# Patient Record
Sex: MALE | Race: WHITE | Employment: FULL TIME | URBAN - METROPOLITAN AREA
[De-identification: names, ages, dates, MRNs, and addresses within clinical notes are randomized per-mention and may not be internally consistent; named-entity substitution may affect disease eponyms.]

---

## 2021-08-27 RX ORDER — ASPIRIN 81 MG/1
81 TABLET ORAL DAILY
COMMUNITY

## 2021-08-27 NOTE — PROGRESS NOTES
[facility] PRE-ADMISSION TESTING INSTRUCTIONS    The Preadmission Testing patient is instructed accordingly using the following criteria (check applicable):    ARRIVAL INSTRUCTIONS:  [x] Parking the day of Surgery is located in the Main Entrance lot. Upon entering the door, make an immediate right to the surgery reception desk    [x] Bring photo ID and insurance card    [x] Bring in a copy of Living will or Durable Power of  papers. [x] Please be sure to arrange for responsible adult to provide transportation to and from the hospital    [x] Please arrange for responsible adult to be with you for the 24 hour period post procedure due to having anesthesia      GENERAL INSTRUCTIONS:    [x] Nothing by mouth after midnight, including gum, candy, mints or water    [x] You may brush your teeth, but do not swallow any water    [] Take medications as instructed with 1-2 oz of water    [] Stop herbal supplements and vitamins 5 days prior to procedure    [x] Follow preop dosing of blood thinners per physician instructions    [] Take 1/2 dose of evening insulin, but no insulin after midnight    [] No oral diabetic medications after midnight    [] If diabetic and have low blood sugar or feel symptomatic, take 1-2oz apple juice only    [] Bring inhalers day of surgery    [] Bring C-PAP/ Bi-Pap day of surgery    [] Bring urine specimen day of surgery    [x] Shower or bath with soap, lather and rinse well, AM of Surgery, no lotion, powders or creams     [] Follow bowel prep as instructed per surgeon    [x] No tobacco products within 24 hours of surgery     [x] No alcohol or illegal drug use within 24 hours of surgery.     [x] Jewelry, body piercing's, eyeglasses, contact lenses and dentures are not permitted into surgery (bring cases)      [] Please do not wear any nail polish, make up or hair products on the day of surgery    [] You can expect a call the business day prior to procedure to notify you if your arrival time changes    [x] If you receive a survey after surgery we would greatly appreciate your comments    [] Parent/guardian of a minor must accompany their child and remain on the premises  the entire time they are under our care     [] Pediatric patients may bring favorite toy, blanket or comfort item with them    [] A caregiver or family member must remain with the patient during their stay if they are mentally handicapped, have dementia, disoriented or unable to use a call light or would be a safety concern if left unattended    [x] Please notify surgeon if you develop any illness between now and time of surgery (cold, cough, sore throat, fever, nausea, vomiting) or any signs of infections  including skin, wounds, and dental.    [x]  The Outpatient Pharmacy is available to fill your prescription here on your day of surgery, ask your preop nurse for details    [x] Other instructions: wear loose, comfortable clothing    EDUCATIONAL MATERIALS PROVIDED:    [] PAT Preoperative Education Packet/Booklet     [] Medication List    [] Transfusion bracelet applied with instructions    [] Shower with soap, lather and rinse well, and use CHG wipes provided the evening before surgery as instructed    [] Incentive spirometer with instructions

## 2021-08-27 NOTE — PROGRESS NOTES
Have you been tested for COVID  No  vaccinated         Have you been told you were positive for COVID No  Have you had any known exposure to someone that is positive for COVID No  Do you have a cough                   No              Do you have shortness of breath No                 Do you have a sore throat            No                Are you having chills                    No                Are you having muscle aches. No                    Please come to the hospital wearing a mask and have your significant other wear a mask as well. Both of you should check your temperature before leaving to come here,  if it is 100 or higher please call [phone] for instruction.

## 2021-08-28 ENCOUNTER — ANESTHESIA EVENT (OUTPATIENT)
Dept: OPERATING ROOM | Age: 71
End: 2021-08-28
Payer: MEDICARE

## 2021-08-30 ENCOUNTER — HOSPITAL ENCOUNTER (OUTPATIENT)
Age: 71
Setting detail: OUTPATIENT SURGERY
Discharge: HOME | End: 2021-08-30
Attending: OPHTHALMOLOGY | Admitting: OPHTHALMOLOGY
Payer: MEDICARE

## 2021-08-30 ENCOUNTER — ANESTHESIA (OUTPATIENT)
Dept: OPERATING ROOM | Age: 71
End: 2021-08-30
Payer: MEDICARE

## 2021-08-30 VITALS
BODY MASS INDEX: 20.45 KG/M2 | TEMPERATURE: 97 F | DIASTOLIC BLOOD PRESSURE: 62 MMHG | OXYGEN SATURATION: 98 % | WEIGHT: 151 LBS | HEART RATE: 64 BPM | SYSTOLIC BLOOD PRESSURE: 138 MMHG | HEIGHT: 72 IN | RESPIRATION RATE: 18 BRPM

## 2021-08-30 VITALS
RESPIRATION RATE: 16 BRPM | OXYGEN SATURATION: 100 % | SYSTOLIC BLOOD PRESSURE: 129 MMHG | DIASTOLIC BLOOD PRESSURE: 64 MMHG

## 2021-08-30 PROBLEM — H33.22 RD (RETINAL DETACHMENT), LEFT: Chronic | Status: ACTIVE | Noted: 2021-08-30

## 2021-08-30 PROCEDURE — 6360000002 HC RX W HCPCS

## 2021-08-30 PROCEDURE — 2720000010 HC SURG SUPPLY STERILE: Performed by: OPHTHALMOLOGY

## 2021-08-30 PROCEDURE — 7100000010 HC PHASE II RECOVERY - FIRST 15 MIN: Performed by: OPHTHALMOLOGY

## 2021-08-30 PROCEDURE — 7100000011 HC PHASE II RECOVERY - ADDTL 15 MIN: Performed by: OPHTHALMOLOGY

## 2021-08-30 PROCEDURE — 6370000000 HC RX 637 (ALT 250 FOR IP): Performed by: OPHTHALMOLOGY

## 2021-08-30 PROCEDURE — 3700000000 HC ANESTHESIA ATTENDED CARE: Performed by: OPHTHALMOLOGY

## 2021-08-30 PROCEDURE — 6360000002 HC RX W HCPCS: Performed by: OPHTHALMOLOGY

## 2021-08-30 PROCEDURE — 2580000003 HC RX 258

## 2021-08-30 PROCEDURE — 2709999900 HC NON-CHARGEABLE SUPPLY: Performed by: OPHTHALMOLOGY

## 2021-08-30 PROCEDURE — 3600000003 HC SURGERY LEVEL 3 BASE: Performed by: OPHTHALMOLOGY

## 2021-08-30 PROCEDURE — 2500000003 HC RX 250 WO HCPCS: Performed by: OPHTHALMOLOGY

## 2021-08-30 PROCEDURE — 3600000013 HC SURGERY LEVEL 3 ADDTL 15MIN: Performed by: OPHTHALMOLOGY

## 2021-08-30 PROCEDURE — 2500000003 HC RX 250 WO HCPCS

## 2021-08-30 PROCEDURE — 3700000001 HC ADD 15 MINUTES (ANESTHESIA): Performed by: OPHTHALMOLOGY

## 2021-08-30 RX ORDER — FLURBIPROFEN SODIUM 0.3 MG/ML
1 SOLUTION/ DROPS OPHTHALMIC PRN
Status: COMPLETED | OUTPATIENT
Start: 2021-08-30 | End: 2021-08-30

## 2021-08-30 RX ORDER — MIDAZOLAM HYDROCHLORIDE 1 MG/ML
INJECTION INTRAMUSCULAR; INTRAVENOUS PRN
Status: DISCONTINUED | OUTPATIENT
Start: 2021-08-30 | End: 2021-08-30 | Stop reason: SDUPTHER

## 2021-08-30 RX ORDER — LIDOCAINE HYDROCHLORIDE 10 MG/ML
INJECTION, SOLUTION EPIDURAL; INFILTRATION; INTRACAUDAL; PERINEURAL PRN
Status: DISCONTINUED | OUTPATIENT
Start: 2021-08-30 | End: 2021-08-30 | Stop reason: SDUPTHER

## 2021-08-30 RX ORDER — ATROPINE SULFATE 10 MG/ML
SOLUTION/ DROPS OPHTHALMIC PRN
Status: DISCONTINUED | OUTPATIENT
Start: 2021-08-30 | End: 2021-08-30 | Stop reason: ALTCHOICE

## 2021-08-30 RX ORDER — TOBRAMYCIN AND DEXAMETHASONE 3; 1 MG/ML; MG/ML
1 SUSPENSION/ DROPS OPHTHALMIC PRN
Status: COMPLETED | OUTPATIENT
Start: 2021-08-30 | End: 2021-08-30

## 2021-08-30 RX ORDER — CEFAZOLIN SODIUM 1 G/3ML
INJECTION, POWDER, FOR SOLUTION INTRAMUSCULAR; INTRAVENOUS PRN
Status: DISCONTINUED | OUTPATIENT
Start: 2021-08-30 | End: 2021-08-30 | Stop reason: ALTCHOICE

## 2021-08-30 RX ORDER — PROPOFOL 10 MG/ML
INJECTION, EMULSION INTRAVENOUS PRN
Status: DISCONTINUED | OUTPATIENT
Start: 2021-08-30 | End: 2021-08-30 | Stop reason: SDUPTHER

## 2021-08-30 RX ORDER — DEXAMETHASONE SODIUM PHOSPHATE 10 MG/ML
INJECTION, SOLUTION INTRAMUSCULAR; INTRAVENOUS PRN
Status: DISCONTINUED | OUTPATIENT
Start: 2021-08-30 | End: 2021-08-30 | Stop reason: ALTCHOICE

## 2021-08-30 RX ORDER — DEXAMETHASONE SODIUM PHOSPHATE 4 MG/ML
INJECTION, SOLUTION INTRA-ARTICULAR; INTRALESIONAL; INTRAMUSCULAR; INTRAVENOUS; SOFT TISSUE PRN
Status: DISCONTINUED | OUTPATIENT
Start: 2021-08-30 | End: 2021-08-30 | Stop reason: SDUPTHER

## 2021-08-30 RX ORDER — SODIUM CHLORIDE, SODIUM LACTATE, POTASSIUM CHLORIDE, CALCIUM CHLORIDE 600; 310; 30; 20 MG/100ML; MG/100ML; MG/100ML; MG/100ML
INJECTION, SOLUTION INTRAVENOUS CONTINUOUS
Status: DISCONTINUED | OUTPATIENT
Start: 2021-08-30 | End: 2021-08-30 | Stop reason: HOSPADM

## 2021-08-30 RX ORDER — BALANCED SALT SOLUTION ENRICHED WITH BICARBONATE, DEXTROSE, AND GLUTATHIONE
KIT INTRAOCULAR PRN
Status: DISCONTINUED | OUTPATIENT
Start: 2021-08-30 | End: 2021-08-30 | Stop reason: ALTCHOICE

## 2021-08-30 RX ORDER — SODIUM CHLORIDE 0.9 % (FLUSH) 0.9 %
5-40 SYRINGE (ML) INJECTION PRN
Status: DISCONTINUED | OUTPATIENT
Start: 2021-08-30 | End: 2021-08-30 | Stop reason: HOSPADM

## 2021-08-30 RX ORDER — FENTANYL CITRATE 50 UG/ML
INJECTION, SOLUTION INTRAMUSCULAR; INTRAVENOUS PRN
Status: DISCONTINUED | OUTPATIENT
Start: 2021-08-30 | End: 2021-08-30 | Stop reason: SDUPTHER

## 2021-08-30 RX ORDER — SODIUM CHLORIDE 9 MG/ML
25 INJECTION, SOLUTION INTRAVENOUS PRN
Status: DISCONTINUED | OUTPATIENT
Start: 2021-08-30 | End: 2021-08-30 | Stop reason: HOSPADM

## 2021-08-30 RX ORDER — SODIUM CHLORIDE 9 MG/ML
INJECTION, SOLUTION INTRAVENOUS CONTINUOUS PRN
Status: DISCONTINUED | OUTPATIENT
Start: 2021-08-30 | End: 2021-08-30 | Stop reason: SDUPTHER

## 2021-08-30 RX ORDER — SODIUM CHLORIDE 0.9 % (FLUSH) 0.9 %
5-40 SYRINGE (ML) INJECTION EVERY 12 HOURS SCHEDULED
Status: DISCONTINUED | OUTPATIENT
Start: 2021-08-30 | End: 2021-08-30 | Stop reason: HOSPADM

## 2021-08-30 RX ORDER — PHENYLEPHRINE HYDROCHLORIDE 100 MG/ML
1 SOLUTION/ DROPS OPHTHALMIC PRN
Status: COMPLETED | OUTPATIENT
Start: 2021-08-30 | End: 2021-08-30

## 2021-08-30 RX ORDER — ATROPINE SULFATE 10 MG/ML
1 SOLUTION/ DROPS OPHTHALMIC PRN
Status: DISCONTINUED | OUTPATIENT
Start: 2021-08-30 | End: 2021-08-30 | Stop reason: HOSPADM

## 2021-08-30 RX ORDER — TRIAMCINOLONE ACETONIDE 40 MG/ML
INJECTION, SUSPENSION INTRA-ARTICULAR; INTRAMUSCULAR PRN
Status: DISCONTINUED | OUTPATIENT
Start: 2021-08-30 | End: 2021-08-30 | Stop reason: ALTCHOICE

## 2021-08-30 RX ORDER — ONDANSETRON 2 MG/ML
INJECTION INTRAMUSCULAR; INTRAVENOUS PRN
Status: DISCONTINUED | OUTPATIENT
Start: 2021-08-30 | End: 2021-08-30 | Stop reason: SDUPTHER

## 2021-08-30 RX ADMIN — PHENYLEPHRINE HYDROCHLORIDE 1 DROP: 100 SOLUTION/ DROPS OPHTHALMIC at 12:08

## 2021-08-30 RX ADMIN — TOBRAMYCIN AND DEXAMETHASONE 1 DROP: 3; 1 SUSPENSION/ DROPS OPHTHALMIC at 12:12

## 2021-08-30 RX ADMIN — TOBRAMYCIN AND DEXAMETHASONE 1 DROP: 3; 1 SUSPENSION/ DROPS OPHTHALMIC at 12:24

## 2021-08-30 RX ADMIN — FENTANYL CITRATE 25 MCG: 50 INJECTION, SOLUTION INTRAMUSCULAR; INTRAVENOUS at 14:56

## 2021-08-30 RX ADMIN — FENTANYL CITRATE 25 MCG: 50 INJECTION, SOLUTION INTRAMUSCULAR; INTRAVENOUS at 14:39

## 2021-08-30 RX ADMIN — Medication 1 DROP: at 12:08

## 2021-08-30 RX ADMIN — Medication 1 DROP: at 12:24

## 2021-08-30 RX ADMIN — FENTANYL CITRATE 50 MCG: 50 INJECTION, SOLUTION INTRAMUSCULAR; INTRAVENOUS at 13:25

## 2021-08-30 RX ADMIN — MIDAZOLAM 0.5 MG: 1 INJECTION INTRAMUSCULAR; INTRAVENOUS at 14:56

## 2021-08-30 RX ADMIN — MIDAZOLAM 1 MG: 1 INJECTION INTRAMUSCULAR; INTRAVENOUS at 13:20

## 2021-08-30 RX ADMIN — SODIUM CHLORIDE: 9 INJECTION, SOLUTION INTRAVENOUS at 13:20

## 2021-08-30 RX ADMIN — ONDANSETRON 4 MG: 2 INJECTION INTRAMUSCULAR; INTRAVENOUS at 13:45

## 2021-08-30 RX ADMIN — DEXAMETHASONE SODIUM PHOSPHATE 10 MG: 4 INJECTION, SOLUTION INTRAMUSCULAR; INTRAVENOUS at 13:45

## 2021-08-30 RX ADMIN — TOBRAMYCIN AND DEXAMETHASONE 1 DROP: 3; 1 SUSPENSION/ DROPS OPHTHALMIC at 12:08

## 2021-08-30 RX ADMIN — PROPOFOL 80 MG: 10 INJECTION, EMULSION INTRAVENOUS at 13:30

## 2021-08-30 RX ADMIN — PHENYLEPHRINE HYDROCHLORIDE 1 DROP: 100 SOLUTION/ DROPS OPHTHALMIC at 12:12

## 2021-08-30 RX ADMIN — PHENYLEPHRINE HYDROCHLORIDE 1 DROP: 100 SOLUTION/ DROPS OPHTHALMIC at 12:24

## 2021-08-30 RX ADMIN — Medication 1 DROP: at 12:12

## 2021-08-30 RX ADMIN — ATROPINE SULFATE 1 DROP: 10 SOLUTION/ DROPS OPHTHALMIC at 12:05

## 2021-08-30 RX ADMIN — MIDAZOLAM 0.5 MG: 1 INJECTION INTRAMUSCULAR; INTRAVENOUS at 14:39

## 2021-08-30 RX ADMIN — LIDOCAINE HYDROCHLORIDE 20 MG: 10 INJECTION, SOLUTION EPIDURAL; INFILTRATION; INTRACAUDAL; PERINEURAL at 13:30

## 2021-08-30 ASSESSMENT — PULMONARY FUNCTION TESTS: PIF_VALUE: 0

## 2021-08-30 ASSESSMENT — PAIN - FUNCTIONAL ASSESSMENT: PAIN_FUNCTIONAL_ASSESSMENT: 0-10

## 2021-08-30 NOTE — ANESTHESIA PRE PROCEDURE
Department of Anesthesiology  Preprocedure Note       Name:  Jose Baig   Age:  72 y.o.  :  1950                                          MRN:  [id number]         Date:  2021      Surgeon: Chelsy Sargent):  Dwight Pal MD    Procedure: Procedure(s):  PARS PLANA VITRECTOMY 25 GAUGE RETINAL DETACHMENT REPAIR GAS FLUID EXCHANGE LASER LEFT EYE    Medications prior to admission:   Prior to Admission medications    Medication Sig Start Date End Date Taking? Authorizing Provider   aspirin 81 MG EC tablet Take 81 mg by mouth daily   Yes Historical Provider, MD       Current medications:    Current Facility-Administered Medications   Medication Dose Route Frequency Provider Last Rate Last Admin    lactated ringers infusion   IntraVENous Continuous Dwight Pal MD        sodium chloride flush 0.9 % injection 5-40 mL  5-40 mL IntraVENous 2 times per day Dwight Pal MD        sodium chloride flush 0.9 % injection 5-40 mL  5-40 mL IntraVENous PRN Dwight Pal MD        0.9 % sodium chloride infusion  25 mL IntraVENous PRN Dwight Pal MD        phenylephrine (HILDA-SYNEPHRINE) 10 % ophthalmic solution 1 drop  1 drop Left Eye PRN Dwight Pal MD        flurbiprofen (OCUFEN) 0.03 % ophthalmic solution 1 drop  1 drop Left Eye PRN Dwight Pal MD        atropine 1 % ophthalmic solution 1 drop  1 drop Left Eye PRN Dwight Pal MD   1 drop at 21 1205    tobramycin-dexamethasone (TOBRADEX) ophthalmic suspension 1 drop  1 drop Left Eye PRN Dwight Pal MD           Allergies:  No Known Allergies    Problem List:  There is no problem list on file for this patient.       Past Medical History:        Diagnosis Date    Retinal detachment     left eye       Past Surgical History:        Procedure Laterality Date    EYE SURGERY      cataract iol kiersten    EYE SURGERY      laser procedure  2021    KIDNEY STONE REMOVAL      basket procedure    KNEE SURGERY Left     tendon surgery    WISDOM TOOTH EXTRACTION      as teenager       Social History:    Social History     Tobacco Use    Smoking status: Never Smoker    Smokeless tobacco: Never Used   Substance Use Topics    Alcohol use: Yes     Comment: occasionally                                Counseling given: Not Answered      Vital Signs (Current):   Vitals:    08/27/21 1611 08/30/21 1202   BP:  (!) 152/65   Pulse:  64   Resp:  16   Temp:  36.6 °C (97.9 °F)   SpO2:  98%   Weight: 151 lb (68.5 kg) 151 lb (68.5 kg)   Height: 6' (1.829 m) 6' (1.829 m)                                              BP Readings from Last 3 Encounters:   08/30/21 (!) 152/65       NPO Status: Time of last liquid consumption: 2000                        Time of last solid consumption: 1800                        Date of last liquid consumption: 08/29/21                        Date of last solid food consumption: 08/29/21    BMI:   Wt Readings from Last 3 Encounters:   08/30/21 151 lb (68.5 kg)     Body mass index is 20.48 kg/m². CBC: No results found for: WBC, RBC, HGB, HCT, MCV, RDW, PLT    CMP: No results found for: NA, K, CL, CO2, BUN, CREATININE, GFRAA, AGRATIO, LABGLOM, GLUCOSE, PROT, CALCIUM, BILITOT, ALKPHOS, AST, ALT    POC Tests: No results for input(s): POCGLU, POCNA, POCK, POCCL, POCBUN, POCHEMO, POCHCT in the last 72 hours.     Coags: No results found for: PROTIME, INR, APTT    HCG (If Applicable): No results found for: PREGTESTUR, PREGSERUM, HCG, HCGQUANT     ABGs: No results found for: PHART, PO2ART, DAB1YBK, CMW1EIC, BEART, R5ILOMIY     Type & Screen (If Applicable):  No results found for: LABABO, LABRH    Drug/Infectious Status (If Applicable):  No results found for: HIV, HEPCAB    COVID-19 Screening (If Applicable): No results found for: COVID19        Anesthesia Evaluation  Patient summary reviewed and Nursing notes reviewed no history of anesthetic complications:   Airway: Mallampati: III  TM distance: >3 FB Neck ROM: full  Mouth opening: > = 3 FB Dental:      Comment: Patient denies any chipped, loose, or missing teeth     Pulmonary:Negative Pulmonary ROS breath sounds clear to auscultation                             Cardiovascular:Negative CV ROS  Exercise tolerance: good (>4 METS),           Rhythm: regular  Rate: normal           Beta Blocker:  Not on Beta Blocker         Neuro/Psych:   Negative Neuro/Psych ROS              GI/Hepatic/Renal: Neg GI/Hepatic/Renal ROS            Endo/Other:                      ROS comment: Retinal detachment left eye     Scheduled 8/30/2021: Procedure  PARS PLANA VITRECTOMY 25 GAUGE RETINAL DETACHMENT REPAIR GAS FLUID EXCHANGE LASER LEFT EYE (Left )   Abdominal:             Vascular: negative vascular ROS. Other Findings:             Anesthesia Plan      MAC     ASA 2     (#18 Right AC )  Induction: intravenous. MIPS: Prophylactic antiemetics administered. Anesthetic plan and risks discussed with patient. Use of blood products discussed with patient whom consented to blood products. Plan discussed with attending and CRNA. Attending anesthesiologist reviewed and agrees with Preprocedure content            Rebeca Burciaga RN   8/30/2021        DOS STAFF ADDENDUM:    Pt seen and examined, physical exam updated, chart reviewed including anesthesia, drug and allergy history. H&P reviewed. No interval changes to history or physical examination (unless noted above). NPO status confirmed. Anesthetic plan, risks, benefits, alternatives discussed with patient. Patient verbalized an understanding and agrees to proceed.      Alli Mejia MD  Anesthesiologist

## 2021-08-30 NOTE — BRIEF OP NOTE
Brief Postoperative Note      Patient: Zabrina Britt  YOB: 1950  MRN: [id number]    Date of Procedure: 8/30/2021    Pre-Op Diagnosis: RETINAL DETACHMENT LEFT EYE    Post-Op Diagnosis: Same       Procedure(s):  PARS PLANA VITRECTOMY 25 GAUGE RETINAL DETACHMENT REPAIR GAS FLUID EXCHANGE (C3F8) LASER LEFT EYE    Surgeon(s):  Emmanuel Tyler MD    Assistant:  Surgical Assistant: Zulema Hernandez    Anesthesia: Monitor Anesthesia Care    Estimated Blood Loss (mL): Minimal    Complications: None    Specimens:   * No specimens in log *    Implants:  * No implants in log *      Drains: * No LDAs found *    Findings: RETINAL DEFECTS    Electronically signed by Emmanuel Tyler MD on 8/30/2021 at 3:16 PM

## 2021-08-30 NOTE — DISCHARGE INSTR - COC
Continuity of Care Form    Patient Name: Clair Gifford   :  1950  MRN:  [id number]    Admit date:  2021  Discharge date:  ***    Code Status Order: Full Code   Advance Directives:   Advance Care Flowsheet Documentation     Date/Time Healthcare Directive Type of Healthcare Directive Copy in [address] Agent's Name Healthcare Agent's Phone Number    21 1330  Yes, patient has an advance directive for healthcare treatment  Durable power of  for health care  No, copy requested from family  --  --  --          Admitting Physician:  Jocelin Liz MD  PCP: No primary care provider on file. Discharging Nurse: [facility] Unit/Room#: OR POOL/NONE  Discharging Unit Phone Number: ***    Emergency Contact:   Extended Emergency Contact Information  Primary Emergency Contact: Guerline Cleo Mert Phone: [phone]  Relation: Spouse    Past Surgical History:  Past Surgical History:   Procedure Laterality Date    EYE SURGERY      cataract iol kiersten    EYE SURGERY      laser procedure  2021    KIDNEY STONE REMOVAL      basket procedure    KNEE SURGERY Left     tendon surgery    WISDOM TOOTH EXTRACTION      as teenager       Immunization History: There is no immunization history on file for this patient.     Active Problems:  Patient Active Problem List   Diagnosis Code    RD (retinal detachment), left H33.22       Isolation/Infection:   Isolation          No Isolation        Patient Infection Status     None to display          Nurse Assessment:  Last Vital Signs: /63   Pulse 58   Temp 97 °F (36.1 °C) (Temporal)   Resp 10   Ht 6' (1.829 m)   Wt 151 lb (68.5 kg)   SpO2 99%   BMI 20.48 kg/m²     Last documented pain score (0-10 scale): Pain Level: 0  Last Weight:   Wt Readings from Last 1 Encounters:   21 151 lb (68.5 kg)     Mental Status:  {IP PT MENTAL STATUS:}    IV Access:  [facility] IV ZGORJJ:569146213}    Nursing Mobility/ADLs:  Walking   {CHP DME HJOE:063580649}  Transfer  {CHP DME XUPV:820216104}  Bathing  {CHP DME NCKT:186277596}  Dressing  {CHP DME YBKX:543916911}  Toileting  {CHP DME IPTO:582975286}  Feeding  {CHP DME WVVU:265448689}  Med Admin  {CHP DME MRQX:075701195}  Med Delivery   {[facility] MED Delivery:366410417}    Wound Care Documentation and Therapy:        Elimination:  Continence:   · Bowel: {YES / EZ:64261}  · Bladder: {YES / KP:11369}  Urinary Catheter: {Urinary Catheter:145961161}   Colostomy/Ileostomy/Ileal Conduit: {YES / RC:01271}       Date of Last BM: ***    Intake/Output Summary (Last 24 hours) at 2021 1519  Last data filed at 2021 1500  Gross per 24 hour   Intake 400 ml   Output --   Net 400 ml     I/O last 3 completed shifts:   In: 400 [I.V.:400]  Out: -     Safety Concerns:     [employer] Safety Concerns:002500592}    Impairments/Disabilities:      [employer] Impairments/Disabilities:832319970}    Nutrition Therapy:  Current Nutrition Therapy:   [employer] Diet List:807119132}    Routes of Feeding: {P DME Other Feedings:759796692}  Liquids: {Slp liquid thickness:70093}  Daily Fluid Restriction: {CHP DME Yes amt example:139135202}  Last Modified Barium Swallow with Video (Video Swallowing Test): {Done Not Done GGLK:377073651}    Treatments at the Time of Hospital Discharge:   Respiratory Treatments: ***  Oxygen Therapy:  {Therapy; copd oxygen:13397}  Ventilator:    { CC Vent PYFF:805101889}    Rehab Therapies: {THERAPEUTIC INTERVENTION:6522593602}  Weight Bearing Status/Restrictions: [employer]}  Other Medical Equipment (for information only, NOT a DME order):  {EQUIPMENT:369569064}  Other Treatments: ***    Patient's personal belongings (please select all that are sent with patient):  {CHP DME Belongings:152309815}    RN SIGNATURE:  {Esignature:007281030}    CASE MANAGEMENT/SOCIAL WORK SECTION    Inpatient Status Date: ***    Readmission Risk Assessment Score:  Readmission Risk              Risk of Unplanned Readmission:  0           Discharging to Facility/ Agency   · Name:   · Address:  · Phone:  · Fax:    Dialysis Facility (if applicable)   · Name:  · Address:  · Dialysis Schedule:  · Phone:  · Fax:    / signature: {Esignature:308813275}    PHYSICIAN SECTION    Prognosis: {Prognosis:2286159592}    Condition at Discharge: [address] Patient Condition:745776585}    Rehab Potential (if transferring to Rehab): {Prognosis:7880168269}    Recommended Labs or Other Treatments After Discharge: ***    Physician Certification: I certify the above information and transfer of Cheli Dela Cruz  is necessary for the continuing treatment of the diagnosis listed and that he requires {Admit to Appropriate Level of Care:48076} for {GREATER/LESS:822915141} 30 days.      Update Admission H&P: {CHP DME Changes in [facility]:031990012}    PHYSICIAN SIGNATURE:  {Esignature:709642922}

## 2021-08-31 NOTE — OP NOTE
retinotomy was created  inferior. Air-fluid exchange was performed. The retina was flat after  that. Endolaser was applied, 360-degree in a scattered pattern anterior  to the equator and around all retinal defects and around the drainage  retinotomy. At the end of the air-fluid exchange, the retina was flat  with good 360. The air was then exchanged for 14% C3F8 gas. All  sclerotomy trocars were removed and all wounds were found to be  watertight. There were no intraoperative complications. The patient  was brought into the recovery area in stable condition facing down. At  the end of the procedure, also subconjunctival antibiotics and steroids  were injected. The eye was covered with a sterile eye patch and a Carty  shield. DISCHARGE NOTE:  The patient was discharged home in stable condition. She was instructed to maintain facedown, right side down position for  seven days.         Blaire Duffy MD    D: 08/30/2021 15:15:29       T: 08/30/2021 15:18:30     [email or web address]  Job#: [id number]     Doc#: [id number]    CC:

## 2021-08-31 NOTE — ANESTHESIA POSTPROCEDURE EVALUATION
Department of Anesthesiology  Postprocedure Note    Patient: Dea Ramos  MRN: [id number]  YOB: 1950  Date of evaluation: 8/30/2021  Time:  9:57 PM     Procedure Summary     Date: 08/30/21 Room / Location: [facility] OR 03 / [facility]    Anesthesia Start: 1320 Anesthesia Stop: 3394    Procedure: PARS PLANA VITRECTOMY [address] GAS FLUID EXCHANGE (C3F8) LASER LEFT EYE (Left Eye) Diagnosis: (RETINAL DETACHMENT)    Surgeons: Prerna Houser MD Responsible Provider: Blanca Nunez MD    Anesthesia Type: MAC ASA Status: 2          Anesthesia Type: MAC    Ryan Phase I: Rayn Score: 10    Ryan Phase II: Ryan Score: 10    Last vitals: Reviewed and per EMR flowsheets.        Anesthesia Post Evaluation    Patient location during evaluation: PACU  Patient participation: complete - patient participated  Level of consciousness: awake and alert  Pain score: 0  Airway patency: patent  Nausea & Vomiting: no vomiting and no nausea  Complications: no  Cardiovascular status: hemodynamically stable  Respiratory status: spontaneous ventilation  Hydration status: stable

## (undated) DEVICE — NEEDLE FLTR 18GA L1.5IN MEM THK5UM BLNT DISP

## (undated) DEVICE — GLOVE SURG SZ 7 CRM LTX FREE POLYISOPRENE POLYMER BEAD ANTI

## (undated) DEVICE — 25+® REVOLUTION DSP ILM FORCEPS: Brand: ALCON GRIESHABER REVOLUTION 25+

## (undated) DEVICE — SURGICAL PROCEDURE PACK 25 GA VLV CPM 10K

## (undated) DEVICE — CAUTERY BPLR 25GA INTOCU W/ HNDPC CRD DISP FOR CX9404

## (undated) DEVICE — NEEDLE ANES 23GA L1.5IN RETROBLB

## (undated) DEVICE — PAD PREP L 2 PLY 70% ISO ALC NONWOVEN SFT ABSRB TOP ANTISEP

## (undated) DEVICE — SYRINGE MED 50ML LUERLOCK TIP

## (undated) DEVICE — TAPE ADH W1INXL10YD WHT PAPR GENTLE BRTH FLX COMFORTABLE

## (undated) DEVICE — Z DUP USE 2522782 SOLUTION IRRIG 1000ML STRL H2O PLAS CONTAINER UROMATIC

## (undated) DEVICE — Device

## (undated) DEVICE — GOWN,SIRUS,FABRNF,XL,20/CS: Brand: MEDLINE

## (undated) DEVICE — DECANTER: Brand: UNBRANDED

## (undated) DEVICE — PRONE PILLOW: Brand: DEROYAL

## (undated) DEVICE — BRACELET ID ALERT FOR PT INFO ISPAN

## (undated) DEVICE — SOLUTION IV IRRIG 500ML 0.9% SODIUM CHL 2F7123

## (undated) DEVICE — PROBE OPHTH LSR FIX SHFT MOV FBR INVRT STRL DIR 25GA

## (undated) DEVICE — BIOM OPTIC SET DISPOSABLE, WITH BIOM HD FLEX LENS FOR F=175 MM OR F=200 MM: Brand: BIOM OPTIC SET DISPOSABLE, WITH BIOM HD FLEX LENS FOR F=175 MM OR F=200 MM

## (undated) DEVICE — SYRINGE 20ML LL S/C 50

## (undated) DEVICE — PACK PROCEDURE SURG RETINAL ST ES CUST

## (undated) DEVICE — SYRINGE, LUER LOCK, 10ML: Brand: MEDLINE

## (undated) DEVICE — SYRINGE, LUER LOCK, 5ML: Brand: MEDLINE

## (undated) DEVICE — SET SCLERAL BUCKLE INSTR

## (undated) DEVICE — GLOVE SURG SZ 65 CRM LTX FREE POLYISOPRENE POLYMER BEAD ANTI

## (undated) DEVICE — 25 MM 1.2 MICRON SYRINGE FILTER: Brand: SUPOR

## (undated) DEVICE — DRAPE MICSCP FULL FLD STRL OCULUS ZEISS

## (undated) DEVICE — 40436 HEAD REST OCULAR: Brand: 40436 HEAD REST OCULAR

## (undated) DEVICE — CANNULA INJ 25GA SFT TIP DISP

## (undated) DEVICE — GLOVE ORANGE PI 7   MSG9070

## (undated) DEVICE — SOLUTION IRRIGATION BAL SALT SOLUTION 15 ML STRL BSS

## (undated) DEVICE — SOLUTION IV IRRIG WATER 1000ML POUR BRL 2F7114

## (undated) DEVICE — 3M™ TEGADERM™ TRANSPARENT FILM DRESSING FRAME STYLE, 1626W, 4 IN X 4-3/4 IN (10 CM X 12 CM), 50/CT 4CT/CASE: Brand: 3M™ TEGADERM™

## (undated) DEVICE — SHIELD EYE W3XL2.5IN UNIV CLR PLAS LTWT

## (undated) DEVICE — SET VITRECTOMY

## (undated) DEVICE — MICROSURGICAL INSTRUMENT 25GA SOFT TIP CANNULA, DSP, 0.8MM: Brand: ALCON